# Patient Record
Sex: MALE | Race: WHITE
[De-identification: names, ages, dates, MRNs, and addresses within clinical notes are randomized per-mention and may not be internally consistent; named-entity substitution may affect disease eponyms.]

---

## 2019-01-14 ENCOUNTER — HOSPITAL ENCOUNTER (EMERGENCY)
Dept: HOSPITAL 11 - JP.ED | Age: 69
Discharge: HOME | End: 2019-01-14
Payer: MEDICARE

## 2019-01-14 VITALS — DIASTOLIC BLOOD PRESSURE: 88 MMHG | SYSTOLIC BLOOD PRESSURE: 144 MMHG

## 2019-01-14 DIAGNOSIS — R10.32: ICD-10-CM

## 2019-01-14 DIAGNOSIS — K21.9: Primary | ICD-10-CM

## 2019-01-14 DIAGNOSIS — Z88.0: ICD-10-CM

## 2019-01-14 NOTE — EDM.PDOC
ED HPI GENERAL MEDICAL PROBLEM





- General


Chief Complaint: General


Stated Complaint: CHEST PAIN, LEFT ARM ACHE AND PAINS ON LT SIDE


Time Seen by Provider: 01/14/19 09:30


Source of Information: Reports: Patient, Family


History Limitations: Reports: No Limitations





- History of Present Illness


INITIAL COMMENTS - FREE TEXT/NARRATIVE: 





68-year-old male who is been having intermittent abdominal pain in the left 

lower quadrant for the past 2 months, and brief intermittent upper abdominal 

and chest discomfort for the past several weeks. He had another episode this 

morning so called the clinic to talk to Dr. Antoine but was sent over to the 

emergency room. He arrives asymptomatic. He walks one to 2 miles a day without 

difficulty. His main concern is his abdomen, he feels distended with lots of 

"burping" but his appetite is fine, bowels are normal, his guaiac was negative 

last February. No weight loss.


Onset: Gradual


Improves with: Reports: Other (Actually seems better when he is active, has no 

symptoms when walking)


Worsens with: Reports: None


  ** Left Chest


Pain Score (Numeric/FACES): 5





- Related Data


 Allergies











Allergy/AdvReac Type Severity Reaction Status Date / Time


 


Penicillins Allergy Mild Rash Verified 01/14/19 09:19











Home Meds: 


 Home Meds





NK [No Known Home Meds]  03/18/15 [History]











Past Medical History


HEENT History: Reports: Cataract, Impaired Vision


Gastrointestinal History: Reports: Other (See Below)


Other Gastrointestinal History: umbilical





- Past Surgical History


HEENT Surgical History: Reports: Cataract Surgery





Social & Family History





- Tobacco Use


Smoking Status *Q: Never Smoker





- Alcohol Use


Days Per Week of Alcohol Use: 2


Number of Drinks Per Day: 1


Total Drinks Per Week: 2





- Recreational Drug Use


Recreational Drug Use: No





ED ROS GENERAL





- Review of Systems


Review Of Systems: See Below


Constitutional: Denies: Fever, Chills


HEENT: Reports: No Symptoms


Respiratory: Denies: Shortness of Breath


Cardiovascular: Reports: Chest Pain (Intermittent brief chest spasms or pains 

in the epigastric and substernal area, usually lasting just a few seconds).  

Denies: Dyspnea on Exertion, Palpitations


GI/Abdominal: Reports: Abdominal Pain (Left lower quadrant, intermittent over 

the past 3 months).  Denies: Constipation, Diarrhea, Hematemesis, Hematochezia, 

Vomiting


: Reports: No Symptoms


Skin: Reports: No Symptoms


Neurological: Reports: No Symptoms


Psychiatric: Reports: No Symptoms





ED EXAM, GENERAL





- Physical Exam


Exam: See Below


Exam Limited By: No Limitations


General Appearance: Alert, No Apparent Distress


Eye Exam: Bilateral Eye: Normal Inspection


Neck: Supple


Respiratory/Chest: No Respiratory Distress, Lungs Clear


Cardiovascular: Regular Rate, Rhythm


GI/Abdominal: Soft, Non-Tender.  No: Rebound, Tender


Extremities: Normal Inspection


Neurological: Alert, Oriented


Psychiatric: Normal Affect, Normal Mood


Skin Exam: Warm, Dry





Course





- Vital Signs


Last Recorded V/S: 


 Last Vital Signs











Temp  95.6 F   01/14/19 09:18


 


Pulse  86   01/14/19 09:18


 


Resp  21 H  01/14/19 09:18


 


BP  144/88 H  01/14/19 09:18


 


Pulse Ox  98   01/14/19 09:18














- Orders/Labs/Meds


Labs: 


 Laboratory Tests











  01/14/19 01/14/19 Range/Units





  09:49 09:49 


 


WBC  4.9   (4.5-11.0)  K/uL


 


RBC  5.08   (4.30-5.90)  M/uL


 


Hgb  16.5 H   (12.0-15.0)  g/dL


 


Hct  45.9   (40.0-54.0)  %


 


MCV  90   (80-98)  fL


 


MCH  33 H   (27-31)  pg


 


MCHC  36   (32-36)  %


 


Plt Count  253   (150-400)  K/uL


 


Neut % (Auto)  55   (36-66)  %


 


Lymph % (Auto)  30   (24-44)  %


 


Mono % (Auto)  13 H   (2-6)  %


 


Eos % (Auto)  2   (2-4)  %


 


Baso % (Auto)  1   (0-1)  %


 


Sodium   138 L  (140-148)  mmol/L


 


Potassium   4.3  (3.6-5.2)  mmol/L


 


Chloride   101  (100-108)  mmol/L


 


Carbon Dioxide   26  (21-32)  mmol/L


 


Anion Gap   15.3 H  (5.0-14.0)  mmol/L


 


BUN   16  (7-18)  mg/dL


 


Creatinine   0.9  (0.8-1.3)  mg/dL


 


Est Cr Clr Drug Dosing   83.36  mL/min


 


Estimated GFR (MDRD)   > 60  (>60)  


 


Glucose   133 H  ()  mg/dL


 


Calcium   9.3  (8.5-10.1)  mg/dL


 


Total Bilirubin   0.7  (0.2-1.0)  mg/dL


 


AST   19  (15-37)  U/L


 


ALT   45  (12-78)  U/L


 


Alkaline Phosphatase   71  ()  U/L


 


Troponin I   < 0.017  (0.000-0.056)  ng/mL


 


Total Protein   7.5  (6.4-8.2)  g/dL


 


Albumin   3.8  (3.4-5.0)  g/dL


 


Globulin   3.7 H  (2.3-3.5)  g/dL


 


Albumin/Globulin Ratio   1.0 L  (1.2-2.2)  


 


Lipase   138  ()  U/L














- Re-Assessments/Exams


Free Text/Narrative Re-Assessment/Exam: 





01/14/19 10:00


Explained to the patient his symptoms aren't cardiac, a CBC CMP and troponin 

were obtained.


01/14/19 10:45


Labs are all very reassuring, troponin is 0, hemoglobin is normal. Patient is 

given a try a trial of omeprazole 40 mg daily for the next 2 weeks, and discuss 

with Dr. Antoine a recheck with possible stress test and/or colonoscopy. He'll 

return sooner if worsening.





Departure





- Departure


Time of Disposition: 10:56


Disposition: Home, Self-Care 01


Condition: Good


Clinical Impression: 


Acid reflux


Qualifiers:


 Esophagitis presence: esophagitis presence not specified Qualified Code(s): 

K21.9 - Gastro-esophageal reflux disease without esophagitis





Abdominal pain


Qualifiers:


 Abdominal location: left lower quadrant Qualified Code(s): R10.32 - Left lower 

quadrant pain








- Discharge Information


Instructions:  Abdominal Pain, Adult, Food Choices for Gastroesophageal Reflux 

Disease, Adult


Referrals: 


Mj Antoine MD [Primary Care Provider] - 


Forms:  ED Department Discharge


Care Plan Goals: 


Try 40 mg of omeprazole daily either one dose or divided doses for the next 2 

weeks. Call Dr. Antoine to schedule a recheck within the next 1-2 weeks to 

discuss a possible stress test, colonoscopy, or both. Return to the emergency 

room if worsening despite treatment.

## 2019-02-14 ENCOUNTER — HOSPITAL ENCOUNTER (OUTPATIENT)
Dept: HOSPITAL 11 - JP.SDS | Age: 69
Discharge: HOME | End: 2019-02-14
Attending: SURGERY
Payer: MEDICARE

## 2019-02-14 VITALS — DIASTOLIC BLOOD PRESSURE: 61 MMHG | SYSTOLIC BLOOD PRESSURE: 109 MMHG

## 2019-02-14 DIAGNOSIS — R10.13: ICD-10-CM

## 2019-02-14 DIAGNOSIS — D12.2: ICD-10-CM

## 2019-02-14 DIAGNOSIS — Z12.11: Primary | ICD-10-CM

## 2019-02-14 DIAGNOSIS — K21.9: ICD-10-CM

## 2019-02-14 DIAGNOSIS — Z87.891: ICD-10-CM

## 2019-02-14 DIAGNOSIS — Z88.0: ICD-10-CM

## 2019-02-14 PROCEDURE — 43235 EGD DIAGNOSTIC BRUSH WASH: CPT

## 2019-02-14 PROCEDURE — 88305 TISSUE EXAM BY PATHOLOGIST: CPT

## 2019-02-14 PROCEDURE — 45385 COLONOSCOPY W/LESION REMOVAL: CPT

## 2019-02-14 NOTE — OR
DATE OF PROCEDURE:  02/14/2019

 

PROCEDURE:

1. EGD.

2. Colonoscopy.

 

FINDINGS:

1. Normal EGD.

2. Transverse colon polyp approximately 1 cm, completely removed using hot snare.

 

COMPLICATIONS:  None.

 

ASSISTANT:  None.

 

ANESTHESIA:  MAC.

 

RISKS:  Risks, benefits, alternatives, and limitations including, but not limited to

infection, bleeding, and perforation were explained to the patient, who wished to proceed.

 

PROCEDURE IN DETAIL:  The patient was placed in left lateral decubitus position.  The EGD

scope was introduced and advanced atraumatically into the second part of the duodenum.  No

evidence of duodenitis or ulceration.  There was no evidence of gastritis or ulceration.  On

retroflexion, no hiatal hernia.

 

The GE junction was normal.  The esophagus was normal.

 

The colonoscopy was then performed next.  Digital rectal exam was performed without

abnormality.  Scope was introduced and advanced atraumatically to the ileocecal valve.  A

photo was taken of this.

 

The scope was then brought back through the ascending, transverse, descending colon, and

retroflexed.  The aforementioned polyp was identified in the ascending colon and completely

removed using hot snare.  No abnormalities on retroflex.  The patient tolerated the

procedure well.

 

 

 

 

Anthony Macdonald MD

DD:  02/14/2019 09:09:43

DT:  02/14/2019 10:25:43

Job #:  [id number]

## 2020-01-22 NOTE — EDM.PDOC
ED HPI GENERAL MEDICAL PROBLEM





- General


Chief Complaint: Abdominal Pain


Stated Complaint: LOWER ABD PAIN, HERNIA?


Time Seen by Provider: 01/22/20 13:00


Source of Information: Reports: Patient, Family


History Limitations: Reports: No Limitations





- History of Present Illness


INITIAL COMMENTS - FREE TEXT/NARRATIVE: 





69-year-old male with a chronic umbilical hernia, has been present at least the 

last 4 years but today while snow skiing it became incarcerated. He has had 

some unusual bowel urges over the past 2 days and mild discomfort. No nausea or 

vomiting. He now comes into the emergency room extremely uncomfortable with 

pain at the umbilicus.


Onset: Unknown/Unsure (Likely at least partially strangulated for the last 2 

days)


Associated Symptoms: Reports: Other (Low back discomfort).  Denies: Nausea/

Vomiting, Shortness of Breath


  ** Abdomen


Pain Score (Numeric/FACES): 10





- Related Data


 Allergies











Allergy/AdvReac Type Severity Reaction Status Date / Time


 


Penicillins Allergy Mild Rash Verified 01/22/20 12:56











Home Meds: 


 Home Meds





NK [No Known Home Meds]  03/18/15 [History]











Past Medical History


HEENT History: Reports: Cataract, Impaired Vision


Gastrointestinal History: Reports: GERD, Other (See Below)


Other Gastrointestinal History: umbilical


Neurological History: Reports: Concussion





- Infectious Disease History


Infectious Disease History: Reports: Chicken Pox, Measles





- Past Surgical History


HEENT Surgical History: Reports: Cataract Surgery


GI Surgical History: Reports: None





Social & Family History





- Tobacco Use


Smoking Status *Q: Never Smoker





- Caffeine Use


Caffeine Use: Reports: Coffee





- Recreational Drug Use


Recreational Drug Use: No





ED ROS GENERAL





- Review of Systems


Review Of Systems: See Below


Constitutional: Reports: Malaise.  Denies: Fever, Chills


Respiratory: Denies: Shortness of Breath


Cardiovascular: Denies: Chest Pain


GI/Abdominal: Reports: Abdominal Pain.  Denies: Nausea, Vomiting


Musculoskeletal: Reports: Back Pain


Neurological: Reports: No Symptoms





ED EXAM, GI/ABD





- Physical Exam


Exam: See Below


Exam Limited By: No Limitations


General Appearance: Alert, Moderate Distress


Eyes: Bilateral: Normal Appearance


Head: Atraumatic


Respiratory/Chest: No Respiratory Distress, Lungs Clear


Cardiovascular: Regular Rate, Rhythm


GI/Abdominal Exam: Normal Bowel Sounds, Other (Firm extremely tender herniation 

at the umbilicus)





Course





- Vital Signs


Last Recorded V/S: 


 Last Vital Signs











Temp  98.4 F   01/22/20 12:59


 


Pulse  71   01/22/20 12:59


 


Resp  22 H  01/22/20 12:59


 


BP  156/80 H  01/22/20 12:59


 


Pulse Ox  99   01/22/20 12:59














- Orders/Labs/Meds


Labs: 


 Laboratory Tests











  01/22/20 01/22/20 01/22/20 Range/Units





  13:20 13:20 13:20 


 


WBC  10.6    (4.5-11.0)  K/uL


 


RBC  5.27    (4.30-5.90)  M/uL


 


Hgb  16.4 H    (12.0-15.0)  g/dL


 


Hct  47.6    (40.0-54.0)  %


 


MCV  90    (80-98)  fL


 


MCH  31    (27-31)  pg


 


MCHC  35    (32-36)  %


 


Plt Count  284    (150-400)  K/uL


 


Neut % (Auto)  86 H    (36-66)  %


 


Lymph % (Auto)  9 L    (24-44)  %


 


Mono % (Auto)  6    (2-6)  %


 


Eos % (Auto)  0 L    (2-4)  %


 


Baso % (Auto)  0    (0-1)  %


 


Sodium   136 L   (140-148)  mmol/L


 


Potassium   3.9   (3.6-5.2)  mmol/L


 


Chloride   99 L   (100-108)  mmol/L


 


Carbon Dioxide   23   (21-32)  mmol/L


 


Anion Gap   17.9 H   (5.0-14.0)  mmol/L


 


BUN   17   (7-18)  mg/dL


 


Creatinine   1.0   (0.8-1.3)  mg/dL


 


Est Cr Clr Drug Dosing   74.01   mL/min


 


Estimated GFR (MDRD)   > 60   (>60)  


 


Glucose   154 H   ()  mg/dL


 


Lactic Acid    4.6 H  (0.4-2.0)  mmol/L


 


Calcium   9.1   (8.5-10.1)  mg/dL


 


Total Bilirubin   0.6   (0.2-1.0)  mg/dL


 


AST   21   (15-37)  U/L


 


ALT   35   (12-78)  U/L


 


Alkaline Phosphatase   82   ()  U/L


 


Total Protein   8.0   (6.4-8.2)  g/dL


 


Albumin   4.4   (3.4-5.0)  g/dL


 


Globulin   3.6 H   (2.3-3.5)  g/dL


 


Albumin/Globulin Ratio   1.2   (1.2-2.2)  














- Re-Assessments/Exams


Free Text/Narrative Re-Assessment/Exam: 





01/22/20 13:36


Within 20 minutes of the reduction of incarcerated hernia, the patient's 

symptoms had all but resolved. Dr. Hartley consult for surgery, and recommended 

wearing a hernia belt for the next week and is scheduled surgery for next 

Monday. He'll return sooner if he develops symptoms such as persistent nausea 

or vomiting, increased pain or fever.


01/22/20 14:13


White count is normal, lactic acid is mildly elevated at 4.6.





Departure





- Departure


Time of Disposition: 13:51


Disposition: Home, Self-Care 01


Clinical Impression: 


 Umbilical hernia, incarcerated








- Discharge Information


Instructions:  Umbilical Hernia, Adult


Referrals: 


Mj Antoine MD [Primary Care Provider] - 


Forms:  ED Department Discharge


Care Plan Goals: 


Where the belt at all times to prevent recurrence of herniation. Activity as 

tolerated, and follow-up next week Monday for surgery as discussed with Dr. Hartley. Return anytime sooner if you develop symptoms such as persistent nausea 

or vomiting, increased pain or fever.





Sepsis Event Note





- Evaluation


Sepsis Screening Result: No Definite Risk





- Focused Exam


Vital Signs: 


 Vital Signs











  Temp Pulse Resp BP Pulse Ox


 


 01/22/20 12:59  98.4 F  71  22 H  156/80 H  99


 


 01/22/20 12:43  98.4 F  71  22 H  156/80 H  99











Date Exam was Performed: 01/22/20


Time Exam was Performed: 14:08

## 2020-01-22 NOTE — CONS
DATE OF SERVICE:  01/22/2020

 

REFERRING PHYSICIAN:

 

CONSULTING PHYSICIAN:  Maurice Hartley MD

 

HISTORY:  This is a 69-year-old who was shoveling snow today, when he developed quite severe

pain in the umbilical area.  He is known to have an umbilical hernia, which he was told at

some point, it might need to be repaired as it became incarcerated.  He presented to the

emergency room.  This was successfully reduced per Dr. Marley.  The plan at this point is

to proceed electively with an umbilical hernia repair.

 

PAST MEDICAL HISTORY:  Unremarkable.

 

PAST SURGICAL HISTORY:  Shows no previous abdominal surgeries.

 

LABORATORY DATA:  Pending, but expected to be unremarkable.

 

PHYSICAL EXAMINATION:

The patient now with a reduced umbilical hernia.  He still has some incarcerated __________

preperitoneal fat within it, but the main mass has remained well reduced.

 

IMPRESSION:  Incarcerated umbilical hernia.

 

PLAN:  To proceed with laparoscopic if possible open repair with mesh next Monday.  He will

be instructed to wear a binder with some pressure over the umbilical area in the meantime to

try to prevent re-incarceration.  Otherwise, he will be scheduled for the surgery this

coming Monday.  Potential risks were reviewed with the patient, and he wishes to proceed.

He is instructed that if it once again becomes painful or incarcerated in the interim, he

should present to the emergency room once again.

 

 

 

 

Maurice Hartley MD

DD:  01/22/2020 13:37:52

DT:  01/22/2020 16:04:35

Job #:  [id number]

## 2020-01-29 NOTE — DISCH
ADMISSION DIAGNOSES:  Incarcerated umbilical hernia, osteoarthrosis shoulder, history of

tobacco use.

 

DISCHARGE DIAGNOSES:  Diagnostic laparoscopy with repair of incarcerated umbilical hernia

for incarcerated umbilical hernia.  Date of surgery: 01/27/2020.  Surgeon:  Maurice Hartley MD.

 

HISTORY:  Franck Troncoso is a 69-year-old male with an umbilical hernia.  After preoperative

evaluation and discussion of possible risks and possible complications, he wished to proceed

with surgical procedure.

 

HOSPITAL COURSE:  Franck had his surgery on 01/27/2020.  He had no operative complications.

Pain was controlled with Tylenol and he was able to be discharged to home on postoperative

day 1.

 

PHYSICAL EXAMINATION:

GENERAL:  Franck Troncoso is a 69-year-old male, height is 5 feet 10 inches, weight is 199

pounds.  TPR is 98.2, 71, 16.  Blood pressure 139/84.

HEENT:  Negative.

NECK:  Supple.

HEART:  Regular rate and rhythm.

LUNGS:  Clear.

ABDOMEN:  Dressing dry and intact.  Abdominal binder is on.

EXTREMITIES:  Without peripheral edema.

 

DISPOSITION:  Discharged to home.

 

CONDITION:  Stable and improving.

 

FOLLOWUP APPOINTMENT:  Maurice Hartley MD, on 02/05/2020 at 9 a.m.

 

HOME MEDICATIONS:

1. Tylenol 1000 mg 4 times daily p.r.n. pain.

2. Colace 100 mg twice daily, #60, 11 refills.

3. MiraLAX 17 g oral daily, #30 and 11 refills.

 

He is to resume his __________ 1 drop each eye p.r.n. for dry eyes.

 

DIET:  Usual diet as tolerated.  Drink 8 to 10 glasses of water a day.

 

ACTIVITY:  No lifting greater than 10 pounds for 6 weeks.

 

Driving, do not drive for 1 week and while on pain medication.

 

Shower/bathing:  May shower.

 

DISCHARGE INSTRUCTIONS:  Notify provider if any fever, increased pain, nausea, or vomiting.

Keep site clean and dry.  Wear abdominal binder for 6 weeks and put a pressure dressing, a

rolled up Kerlix or washcloth over hernia site for 2 weeks.  Use incentive spirometer 10

times every hour while awake for 1 week.

## 2020-01-30 NOTE — OR
DATE OF PROCEDURE:  01/27/2020

 

SURGEON:  Maurice Hartley MD

 

PREOPERATIVE DIAGNOSIS:  Incarcerated umbilical hernia.

 

POSTOPERATIVE DIAGNOSES:

1. Incarcerated umbilical hernia.

2. Incarcerated secondary epigastric hernia.

 

OPERATIVE PROCEDURES:  Diagnostic laparoscopy with:

1. Repair of incarcerated umbilical hernia with mesh (35340).

2. Repair of additional non incarcerated epigastric hernia with mesh (23692).

3. Placement of Interceed mesh to displace pelvic and abdominal wall from overlying mesh

    and pelvic wall surfaces to limit recurrent adhesion formation.

 

ANESTHESIA:  General.

 

ASSISTANT:  Swati Slater PA-C

 

INDICATIONS FOR PROCEDURE:  This is a 69-year-old male presenting after having an

incarcerated umbilical hernia last week.  This was reduced in the emergency room with some

considerable effort.  At this point, plan is to proceed with a repair of the hernia with a

mesh technique, we will attempt the laparoscopic approach.  Potential risks including

bleeding, infection, injury to underlying viscera, problems with the hernia recurring or the

mesh becoming infected as well as the remote possibility of cardiopulmonary, septic, or

hemorrhagic complications leading to death were all discussed and the patient wishes to

proceed.

 

DETAILS OF PROCEDURE:  The patient was taken to the operating room, placed in a supine

position.  After general endotracheal anesthesia was induced, a Sesay catheter was inserted,

and the abdomen prepped and draped.

 

In the left lateral abdomen, a transverse incision was made and the peritoneal cavity

entered under direct vision with an Optiview trocar, inflated to 15 mmHg pressure with CO2.

Laparoscope was then reinserted.  No underlying trocar insertion site injuries were seen.

Following this, 5 mm trocar was placed in the left lower quadrant as well as left upper

quadrant, and the area of the hernia identified.  This contained some incarcerated omentum

at this point.  No bowel was present on the exam today.  The umbilical hernia contents were

then gradually reduced with combination of external pressure and Harmonic scalpel dissection

and those were then excised and sent as pathologic specimen.  As one divided some of the

preperitoneal fat superiorly, obtained a good surface for application of the mesh. A 1 cm

fascia defect in the mid epigastric line roughly 3 cm above the primary umbilical hernia was

identified.  This contained some preperitoneal fat, which was reduced and reflected back

into the peritoneal cavity.  Some of the falciform ligament was then also divided away from

the abdominal wall to allow more accurate apposition of the mesh to the abdominal wall.

 

At this point, the Ventralight ST mesh with a diameter of 15.3 cm in a circular

configuration was placed after being soaked in antibiotic-containing saline solution and

then placed in intraperitoneal location.  Centered over the hernia, a small stab wound was

made in the skin and the suture passer device passed through the abdominal wall.  The

balloon inflation catheter through the mesh system was then grasped and pulled up through

the abdominal wall bringing the mesh into center location with regard to the hernias.  The

balloon was then inflated and using absorbable tacking screws, the mesh was

circumferentially fixed at its outer edge, and at that point, the balloon deflated and

withdrawn.  Additional tacking screws were then placed more directly around the hernias

circumferentially as well and the mesh appeared to be well fixed at this point.

 

To avoid adhesion formation near between the mesh and adjacent abdominal pelvic sidewall,

the Interceed mesh was then placed to provide separation from those surfaces and the

underlying viscera.  At that point, the trocars were removed.  Fascia at the 12 mm site was

closed with 0 Vicryl stitch.  Bilateral transverse abdominis plane blocks had been placed

and the wounds were then also anesthetized with 1% lidocaine mixed with Marcaine and the

incision was closed at skin level with 4-0 Vicryl stitch.  The patient was taken to the

recovery room in satisfactory condition.

 

Physician assistant, Swati Slater, played an essential role in assisting in this case,

helping to position the patient, retract structures as needed, as well as suturing and

cutting sutures when indicated.  Her presence improved patient safety and decreased the

operative time.

 

 

 

 

Maurice Hartley MD

DD:  01/29/2020 19:20:18

DT:  01/30/2020 08:13:37

Job #:  [id number]